# Patient Record
(demographics unavailable — no encounter records)

---

## 2025-05-14 NOTE — ASSESSMENT
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 check P/cr ratio and renal panel Rx given  offered Farxiga also for DM but declined   -HTN, Hyperlipidemia, Vitamin D def  - Continue with Losartan, Statin, Vitamin D at current dose    -HTN well controlled  -low salt diet  Hyperkalemia: resolved  -low K diet education given   RTC 6-12 months.

## 2025-05-14 NOTE — HISTORY OF PRESENT ILLNESS
[FreeTextEntry1] : following in [place] with PMD ans another nephrologist UA reviewed no protein no edema  A1c 6.9% not tolerating Metformin ( leg cramps). taking 0.5 tab eithey other day  no renal panel done. H/H is good       no fever and no chills. Cardiovascular: the heart rate was not slow, no chest pain and no palpitations. Respiratory: no shortness of breath, no cough and no shortness of breath during exertion. Gastrointestinal: no abdominal pain, no vomiting and no diarrhea. Genitourinary: no dysuria, no urinary hesitancy and no nocturia. Integumentary: no skin lesions. Neurological: no confusion and no dizziness. Endocrine: no episodes of hypoglycemia Constitutional, Eyes, ENT, Cardiovascular, Respiratory, Gastrointestinal, Genitourinary, Neurological and Psychiatric are otherwise negative.